# Patient Record
Sex: FEMALE | Race: OTHER | Employment: UNEMPLOYED | URBAN - METROPOLITAN AREA
[De-identification: names, ages, dates, MRNs, and addresses within clinical notes are randomized per-mention and may not be internally consistent; named-entity substitution may affect disease eponyms.]

---

## 2019-12-16 LAB — GRBS, EXTERNAL: POSITIVE

## 2019-12-20 PROBLEM — Z3A.39 PREGNANCY WITH 39 COMPLETED WEEKS GESTATION: Status: ACTIVE | Noted: 2019-12-20

## 2019-12-21 PROBLEM — O99.820 GBS (GROUP B STREPTOCOCCUS CARRIER), +RV CULTURE, CURRENTLY PREGNANT: Status: ACTIVE | Noted: 2019-12-21

## 2019-12-21 PROBLEM — O99.810 ABNORMAL GLUCOSE TOLERANCE TEST (GTT) DURING PREGNANCY, ANTEPARTUM: Status: ACTIVE | Noted: 2019-12-21

## 2019-12-22 ENCOUNTER — HOSPITAL ENCOUNTER (INPATIENT)
Age: 24
LOS: 2 days | Discharge: HOME | DRG: 560 | End: 2019-12-24
Attending: OBSTETRICS & GYNECOLOGY | Admitting: OBSTETRICS & GYNECOLOGY
Payer: MEDICAID

## 2019-12-22 PROBLEM — O47.9 UTERINE CONTRACTIONS DURING PREGNANCY: Status: ACTIVE | Noted: 2019-12-22

## 2019-12-22 PROBLEM — O09.293 HX OF MATERNAL LACERATION, 4TH DEGREE, CURRENTLY PREGNANT, THIRD TRIMESTER: Status: ACTIVE | Noted: 2019-12-22

## 2019-12-22 LAB
ABO + RH BLD: NORMAL
BASOPHILS # BLD: 0 K/UL (ref 0–0.1)
BASOPHILS NFR BLD: 0 % (ref 0–2)
BLOOD GROUP ANTIBODIES SERPL: NORMAL
DIFFERENTIAL METHOD BLD: ABNORMAL
EOSINOPHIL # BLD: 0.2 K/UL (ref 0–0.4)
EOSINOPHIL NFR BLD: 1 % (ref 0–5)
ERYTHROCYTE [DISTWIDTH] IN BLOOD BY AUTOMATED COUNT: 13.9 % (ref 11.6–14.5)
HCT VFR BLD AUTO: 32.7 % (ref 35–45)
HGB BLD-MCNC: 10.3 G/DL (ref 12–16)
LYMPHOCYTES # BLD: 1.9 K/UL (ref 0.9–3.6)
LYMPHOCYTES NFR BLD: 18 % (ref 21–52)
MCH RBC QN AUTO: 23.9 PG (ref 24–34)
MCHC RBC AUTO-ENTMCNC: 31.5 G/DL (ref 31–37)
MCV RBC AUTO: 75.9 FL (ref 74–97)
MONOCYTES # BLD: 0.6 K/UL (ref 0.05–1.2)
MONOCYTES NFR BLD: 6 % (ref 3–10)
NEUTS SEG # BLD: 7.9 K/UL (ref 1.8–8)
NEUTS SEG NFR BLD: 75 % (ref 40–73)
PLATELET # BLD AUTO: 211 K/UL (ref 135–420)
PMV BLD AUTO: 9.9 FL (ref 9.2–11.8)
RBC # BLD AUTO: 4.31 M/UL (ref 4.2–5.3)
SPECIMEN EXP DATE BLD: NORMAL
WBC # BLD AUTO: 10.6 K/UL (ref 4.6–13.2)

## 2019-12-22 PROCEDURE — 86900 BLOOD TYPING SEROLOGIC ABO: CPT

## 2019-12-22 PROCEDURE — 75410000003 HC RECOV DEL/VAG/CSECN EA 0.5 HR

## 2019-12-22 PROCEDURE — 74011250637 HC RX REV CODE- 250/637: Performed by: OBSTETRICS & GYNECOLOGY

## 2019-12-22 PROCEDURE — 0HQ9XZZ REPAIR PERINEUM SKIN, EXTERNAL APPROACH: ICD-10-PCS | Performed by: OBSTETRICS & GYNECOLOGY

## 2019-12-22 PROCEDURE — 74011000250 HC RX REV CODE- 250

## 2019-12-22 PROCEDURE — 65270000029 HC RM PRIVATE

## 2019-12-22 PROCEDURE — 74011000258 HC RX REV CODE- 258: Performed by: OBSTETRICS & GYNECOLOGY

## 2019-12-22 PROCEDURE — 77010026065 HC OXYGEN MINIMUM MEDICAL AIR

## 2019-12-22 PROCEDURE — 85025 COMPLETE CBC W/AUTO DIFF WBC: CPT

## 2019-12-22 PROCEDURE — 75410000000 HC DELIVERY VAGINAL/SINGLE

## 2019-12-22 PROCEDURE — 74011250636 HC RX REV CODE- 250/636: Performed by: OBSTETRICS & GYNECOLOGY

## 2019-12-22 PROCEDURE — 75410000002 HC LABOR FEE PER 1 HR

## 2019-12-22 RX ORDER — NALBUPHINE HYDROCHLORIDE 10 MG/ML
10 INJECTION, SOLUTION INTRAMUSCULAR; INTRAVENOUS; SUBCUTANEOUS
Status: DISCONTINUED | OUTPATIENT
Start: 2019-12-22 | End: 2019-12-22 | Stop reason: HOSPADM

## 2019-12-22 RX ORDER — OXYCODONE AND ACETAMINOPHEN 5; 325 MG/1; MG/1
2 TABLET ORAL
Status: DISCONTINUED | OUTPATIENT
Start: 2019-12-22 | End: 2019-12-24 | Stop reason: HOSPADM

## 2019-12-22 RX ORDER — PHENYLEPHRINE HCL IN 0.9% NACL 1 MG/10 ML
100 SYRINGE (ML) INTRAVENOUS AS NEEDED
Status: DISCONTINUED | OUTPATIENT
Start: 2019-12-22 | End: 2019-12-22 | Stop reason: HOSPADM

## 2019-12-22 RX ORDER — MINERAL OIL
30 OIL (ML) ORAL AS NEEDED
Status: DISCONTINUED | OUTPATIENT
Start: 2019-12-22 | End: 2019-12-22 | Stop reason: HOSPADM

## 2019-12-22 RX ORDER — FENTANYL/ROPIVACAINE/NS/PF 2MCG/ML-.1
PLASTIC BAG, INJECTION (ML) EPIDURAL
Status: DISCONTINUED
Start: 2019-12-22 | End: 2019-12-22 | Stop reason: WASHOUT

## 2019-12-22 RX ORDER — BUTORPHANOL TARTRATE 2 MG/ML
2 INJECTION INTRAMUSCULAR; INTRAVENOUS
Status: DISCONTINUED | OUTPATIENT
Start: 2019-12-22 | End: 2019-12-22 | Stop reason: HOSPADM

## 2019-12-22 RX ORDER — OXYTOCIN/0.9 % SODIUM CHLORIDE 20/1000 ML
PLASTIC BAG, INJECTION (ML) INTRAVENOUS
Status: DISPENSED
Start: 2019-12-22 | End: 2019-12-23

## 2019-12-22 RX ORDER — TERBUTALINE SULFATE 1 MG/ML
0.25 INJECTION SUBCUTANEOUS
Status: DISCONTINUED | OUTPATIENT
Start: 2019-12-22 | End: 2019-12-22 | Stop reason: HOSPADM

## 2019-12-22 RX ORDER — FENTANYL/ROPIVACAINE/NS/PF 2MCG/ML-.1
1-15 PLASTIC BAG, INJECTION (ML) EPIDURAL
Status: DISCONTINUED | OUTPATIENT
Start: 2019-12-22 | End: 2019-12-22 | Stop reason: HOSPADM

## 2019-12-22 RX ORDER — SODIUM CHLORIDE, SODIUM LACTATE, POTASSIUM CHLORIDE, CALCIUM CHLORIDE 600; 310; 30; 20 MG/100ML; MG/100ML; MG/100ML; MG/100ML
125 INJECTION, SOLUTION INTRAVENOUS CONTINUOUS
Status: DISCONTINUED | OUTPATIENT
Start: 2019-12-22 | End: 2019-12-22 | Stop reason: HOSPADM

## 2019-12-22 RX ORDER — HYDROMORPHONE HYDROCHLORIDE 1 MG/ML
1 INJECTION, SOLUTION INTRAMUSCULAR; INTRAVENOUS; SUBCUTANEOUS
Status: DISCONTINUED | OUTPATIENT
Start: 2019-12-22 | End: 2019-12-22 | Stop reason: HOSPADM

## 2019-12-22 RX ORDER — LIDOCAINE HYDROCHLORIDE 10 MG/ML
INJECTION, SOLUTION EPIDURAL; INFILTRATION; INTRACAUDAL; PERINEURAL
Status: COMPLETED
Start: 2019-12-22 | End: 2019-12-22

## 2019-12-22 RX ORDER — IBUPROFEN 400 MG/1
800 TABLET ORAL
Status: DISCONTINUED | OUTPATIENT
Start: 2019-12-22 | End: 2019-12-24 | Stop reason: HOSPADM

## 2019-12-22 RX ORDER — ACETAMINOPHEN 325 MG/1
650 TABLET ORAL
Status: DISCONTINUED | OUTPATIENT
Start: 2019-12-22 | End: 2019-12-24 | Stop reason: HOSPADM

## 2019-12-22 RX ORDER — FENTANYL CITRATE 50 UG/ML
INJECTION, SOLUTION INTRAMUSCULAR; INTRAVENOUS
Status: DISPENSED
Start: 2019-12-22 | End: 2019-12-23

## 2019-12-22 RX ORDER — METHYLERGONOVINE MALEATE 0.2 MG/ML
0.2 INJECTION INTRAVENOUS AS NEEDED
Status: COMPLETED | OUTPATIENT
Start: 2019-12-22 | End: 2019-12-22

## 2019-12-22 RX ORDER — PROMETHAZINE HYDROCHLORIDE 25 MG/ML
25 INJECTION, SOLUTION INTRAMUSCULAR; INTRAVENOUS
Status: DISCONTINUED | OUTPATIENT
Start: 2019-12-22 | End: 2019-12-24 | Stop reason: HOSPADM

## 2019-12-22 RX ORDER — AMOXICILLIN 250 MG
1 CAPSULE ORAL
Status: DISCONTINUED | OUTPATIENT
Start: 2019-12-22 | End: 2019-12-24 | Stop reason: HOSPADM

## 2019-12-22 RX ORDER — ZOLPIDEM TARTRATE 5 MG/1
5 TABLET ORAL
Status: DISCONTINUED | OUTPATIENT
Start: 2019-12-22 | End: 2019-12-24 | Stop reason: HOSPADM

## 2019-12-22 RX ORDER — NALBUPHINE HYDROCHLORIDE 10 MG/ML
5 INJECTION, SOLUTION INTRAMUSCULAR; INTRAVENOUS; SUBCUTANEOUS
Status: DISCONTINUED | OUTPATIENT
Start: 2019-12-22 | End: 2019-12-22 | Stop reason: HOSPADM

## 2019-12-22 RX ORDER — METHYLERGONOVINE MALEATE 0.2 MG/ML
0.2 INJECTION INTRAVENOUS ONCE
Status: COMPLETED | OUTPATIENT
Start: 2019-12-23 | End: 2019-12-23

## 2019-12-22 RX ORDER — FENTANYL CITRATE 50 UG/ML
100 INJECTION, SOLUTION INTRAMUSCULAR; INTRAVENOUS ONCE
Status: DISCONTINUED | OUTPATIENT
Start: 2019-12-22 | End: 2019-12-22 | Stop reason: HOSPADM

## 2019-12-22 RX ORDER — OXYTOCIN/0.9 % SODIUM CHLORIDE 20/1000 ML
999 PLASTIC BAG, INJECTION (ML) INTRAVENOUS ONCE
Status: COMPLETED | OUTPATIENT
Start: 2019-12-22 | End: 2019-12-22

## 2019-12-22 RX ORDER — MINERAL OIL
OIL (ML) ORAL
Status: DISCONTINUED
Start: 2019-12-22 | End: 2019-12-22 | Stop reason: WASHOUT

## 2019-12-22 RX ORDER — DIPHENHYDRAMINE HYDROCHLORIDE 50 MG/ML
25 INJECTION, SOLUTION INTRAMUSCULAR; INTRAVENOUS
Status: DISCONTINUED | OUTPATIENT
Start: 2019-12-22 | End: 2019-12-22 | Stop reason: HOSPADM

## 2019-12-22 RX ORDER — OXYTOCIN/0.9 % SODIUM CHLORIDE 20/1000 ML
125 PLASTIC BAG, INJECTION (ML) INTRAVENOUS CONTINUOUS
Status: DISCONTINUED | OUTPATIENT
Start: 2019-12-22 | End: 2019-12-22 | Stop reason: HOSPADM

## 2019-12-22 RX ORDER — LIDOCAINE HYDROCHLORIDE 10 MG/ML
20 INJECTION, SOLUTION EPIDURAL; INFILTRATION; INTRACAUDAL; PERINEURAL AS NEEDED
Status: DISCONTINUED | OUTPATIENT
Start: 2019-12-22 | End: 2019-12-22 | Stop reason: HOSPADM

## 2019-12-22 RX ORDER — NALOXONE HYDROCHLORIDE 0.4 MG/ML
0.2 INJECTION, SOLUTION INTRAMUSCULAR; INTRAVENOUS; SUBCUTANEOUS AS NEEDED
Status: DISCONTINUED | OUTPATIENT
Start: 2019-12-22 | End: 2019-12-22 | Stop reason: HOSPADM

## 2019-12-22 RX ADMIN — METHYLERGONOVINE MALEATE 0.2 MG: 0.2 INJECTION, SOLUTION INTRAMUSCULAR; INTRAVENOUS at 20:48

## 2019-12-22 RX ADMIN — SODIUM CHLORIDE 5 MILLION UNITS: 900 INJECTION INTRAVENOUS at 19:20

## 2019-12-22 RX ADMIN — OXYCODONE HYDROCHLORIDE AND ACETAMINOPHEN 2 TABLET: 5; 325 TABLET ORAL at 21:22

## 2019-12-22 RX ADMIN — Medication 19980 MILLI-UNITS/HR: at 19:58

## 2019-12-22 RX ADMIN — SODIUM CHLORIDE, SODIUM LACTATE, POTASSIUM CHLORIDE, AND CALCIUM CHLORIDE 125 ML/HR: 600; 310; 30; 20 INJECTION, SOLUTION INTRAVENOUS at 18:35

## 2019-12-22 RX ADMIN — LIDOCAINE HYDROCHLORIDE 30 ML: 10 INJECTION, SOLUTION EPIDURAL; INFILTRATION; INTRACAUDAL; PERINEURAL at 20:13

## 2019-12-23 LAB
HCT VFR BLD AUTO: 30.7 % (ref 35–45)
HGB BLD-MCNC: 9.6 G/DL (ref 12–16)

## 2019-12-23 PROCEDURE — 36415 COLL VENOUS BLD VENIPUNCTURE: CPT

## 2019-12-23 PROCEDURE — 85018 HEMOGLOBIN: CPT

## 2019-12-23 PROCEDURE — 74011250637 HC RX REV CODE- 250/637: Performed by: OBSTETRICS & GYNECOLOGY

## 2019-12-23 PROCEDURE — 85014 HEMATOCRIT: CPT

## 2019-12-23 PROCEDURE — 65270000029 HC RM PRIVATE

## 2019-12-23 PROCEDURE — 74011250636 HC RX REV CODE- 250/636: Performed by: OBSTETRICS & GYNECOLOGY

## 2019-12-23 RX ORDER — LANOLIN ALCOHOL/MO/W.PET/CERES
1 CREAM (GRAM) TOPICAL 2 TIMES DAILY WITH MEALS
Status: DISCONTINUED | OUTPATIENT
Start: 2019-12-23 | End: 2019-12-24 | Stop reason: HOSPADM

## 2019-12-23 RX ADMIN — IBUPROFEN 800 MG: 400 TABLET, FILM COATED ORAL at 08:18

## 2019-12-23 RX ADMIN — SENNOSIDES AND DOCUSATE SODIUM 1 TABLET: 8.6; 5 TABLET ORAL at 15:46

## 2019-12-23 RX ADMIN — IBUPROFEN 800 MG: 400 TABLET, FILM COATED ORAL at 15:46

## 2019-12-23 RX ADMIN — FERROUS SULFATE TAB 325 MG (65 MG ELEMENTAL FE) 325 MG: 325 (65 FE) TAB at 17:03

## 2019-12-23 RX ADMIN — FERROUS SULFATE TAB 325 MG (65 MG ELEMENTAL FE) 325 MG: 325 (65 FE) TAB at 08:18

## 2019-12-23 RX ADMIN — METHYLERGONOVINE MALEATE 0.2 MG: 0.2 INJECTION, SOLUTION INTRAMUSCULAR; INTRAVENOUS at 02:38

## 2019-12-23 NOTE — PROGRESS NOTES
Patient presents to unit with complaints of contractions at 39w4d, a , with hx of baby 1 year ago. Patient speaks Georgian only and her mother and  are with her for translation. Patient taken to [facility] for assessment.  Bedside and verbal shift report given to STEVEN Prater RN. Care of patient relinquished at this time.      Dr Katty Fortune wasted Fentanyl vial.

## 2019-12-23 NOTE — PROGRESS NOTES
Problem: Patient Education: Go to Patient Education Activity  Goal: Patient/Family Education  Outcome: Progressing Towards Goal     Problem: Vaginal Delivery: Day of Deliver-Laboring  Goal: Activity/Safety  Outcome: Progressing Towards Goal  Goal: Consults, if ordered  Outcome: Progressing Towards Goal  Goal: Diagnostic Test/Procedures  Outcome: Progressing Towards Goal  Goal: Nutrition/Diet  Outcome: Progressing Towards Goal  Goal: Discharge Planning  Outcome: Progressing Towards Goal  Goal: Medications  Outcome: Progressing Towards Goal  Goal: Respiratory  Outcome: Progressing Towards Goal  Goal: Treatments/Interventions/Procedures  Outcome: Progressing Towards Goal  Goal: *Vital signs within defined limits  Outcome: Progressing Towards Goal  Goal: *Labs within defined limits  Outcome: Progressing Towards Goal  Goal: *Hemodynamically stable  Outcome: Progressing Towards Goal  Goal: *Optimal pain control at patient's stated goal  Outcome: Progressing Towards Goal     Problem: Pain  Goal: *Control of Pain  Outcome: Progressing Towards Goal

## 2019-12-23 NOTE — PROGRESS NOTES
Post-Partum Day Number 1 Progress Note    Maura Gordon     Assessment:   Hospital Problems  Date Reviewed: 2019          Codes Class Noted POA    Vaginal delivery ICD-10-CM: O80  ICD-9-CM: 542  2019 No    Overview Signed 2019  7:49 AM by Kvng Zhao MD     x1             Perineal laceration, first degree, delivered ICD-10-CM: O70.0  ICD-9-CM: 664.01  2019 No        Uterine contractions during pregnancy ICD-10-CM: O62.2  ICD-9-CM: 661.20  2019 Yes        Hx of maternal laceration, 4th degree, currently pregnant, third trimester ICD-10-CM: O09.293  ICD-9-CM: V23.49  2019 Yes        GBS (group B Streptococcus carrier), +RV culture, currently pregnant ICD-10-CM: O99.820  ICD-9-CM: V23.89, V02.51  2019 Yes        Pregnancy with 39 completed weeks gestation ICD-10-CM: Z3A.39  ICD-9-CM: V22.2  2019 Yes    Overview Signed 2019  3:57 PM by Kvng Zhao MD     EFW 63%, 8 lbs. Vertex. Doing well, post partum day 1    Plan:  1. Continue routine postpartum and perineal care as well as maternal education. 2. The risks and benefits of the circumcision  procedure and anesthesia including: bleeding, infection, variability of cosmetic results were discussed at length with the mother. She is aware that future repeat procedures may be necessary. She gives informed consent to proceed as noted and her questions are answered. Information for the patient's :  Braulio Schneider [[id number]]   Vaginal, Spontaneous   Patient doing well without significant complaint. Voiding without difficulty, normal lochia. Patient is having some pain where her stitches are. She is nursing. Translation provided by her .     Current Facility-Administered Medications   Medication Dose Route Frequency    influenza vaccine  (6 mos+)(PF) (FLUARIX/FLULAVAL/FLUZONE QUAD) injection 0.5 mL  0.5 mL IntraMUSCular PRIOR TO DISCHARGE Vitals:  Visit Vitals  /66 (BP 1 Location: Right arm, BP Patient Position: At rest)   Pulse 97   Temp 97.9 °F (36.6 °C)   Resp 18   Ht 5' 1\" (1.549 m)   Wt 151 lb (68.5 kg)   LMP 2019 (Approximate)   Breastfeeding Unknown   BMI 28.53 kg/m²     Temp (24hrs), Av.1 °F (36.7 °C), Min:97.9 °F (36.6 °C), Max:98.4 °F (36.9 °C)        Exam:   Patient without distress. Abdomen soft, fundus firm, nontender                Perineum with normal lochia noted. Lower extremities are negative for swelling, cords or tenderness. Labs:     Lab Results   Component Value Date/Time    WBC 10.6 2019 06:35 PM    HGB 9.6 (L) 2019 02:55 AM    HGB 10.3 (L) 2019 06:35 PM    HCT 30.7 (L) 2019 02:55 AM    HCT 32.7 (L) 2019 06:35 PM    PLATELET 920 9306 06:35 PM    Hgb, External 11.1 2019    Hct, External 34 2019    Platelet cnt., External 185 2019       Recent Results (from the past 24 hour(s))   CBC WITH AUTOMATED DIFF    Collection Time: 19  6:35 PM   Result Value Ref Range    WBC 10.6 4.6 - 13.2 K/uL    RBC 4.31 4.20 - 5.30 M/uL    HGB 10.3 (L) 12.0 - 16.0 g/dL    HCT 32.7 (L) 35.0 - 45.0 %    MCV 75.9 74.0 - 97.0 FL    MCH 23.9 (L) 24.0 - 34.0 PG    MCHC 31.5 31.0 - 37.0 g/dL    RDW 13.9 11.6 - 14.5 %    PLATELET 665 695 - 364 K/uL    MPV 9.9 9.2 - 11.8 FL    NEUTROPHILS 75 (H) 40 - 73 %    LYMPHOCYTES 18 (L) 21 - 52 %    MONOCYTES 6 3 - 10 %    EOSINOPHILS 1 0 - 5 %    BASOPHILS 0 0 - 2 %    ABS. NEUTROPHILS 7.9 1.8 - 8.0 K/UL    ABS. LYMPHOCYTES 1.9 0.9 - 3.6 K/UL    ABS. MONOCYTES 0.6 0.05 - 1.2 K/UL    ABS. EOSINOPHILS 0.2 0.0 - 0.4 K/UL    ABS.  BASOPHILS 0.0 0.0 - 0.1 K/UL    DF AUTOMATED     TYPE & SCREEN    Collection Time: 19  6:35 PM   Result Value Ref Range    Crossmatch Expiration 2019     ABO/Rh(D) B POSITIVE     Antibody screen NEG    HEMATOCRIT    Collection Time: 19  2:55 AM   Result Value Ref Range    HCT 30.7 (L) 35.0 - 45.0 %   HEMOGLOBIN    Collection Time: 12/23/19  2:55 AM   Result Value Ref Range    HGB 9.6 (L) 12.0 - 16.0 g/dL

## 2019-12-23 NOTE — PROGRESS NOTES
Bedside and Verbal shift change report given to SHIRA Chase LPN (oncoming nurse) by SHIRA Candelaria RN (offgoing nurse). Report included the following information SBAR, Kardex, Procedure Summary, Intake/Output, MAR, Accordion, Recent Results and Med Rec Status.

## 2019-12-23 NOTE — H&P
Ostetrical History and Physical    Subjective:     Date of Admission: 2019    Patient is a 27 y.o.   female admitted with labor. Note 4th degree previous del. For Obstetric history, see prenantal.    For Review of Systems, see prenatal    Past Medical History:   Diagnosis Date    Vaginal delivery     x1      No past surgical history on file. Prior to Admission medications    Not on File     No Known Allergies   Social History     Tobacco Use    Smoking status: Never Smoker    Smokeless tobacco: Never Used   Substance Use Topics    Alcohol use: Not Currently      No family history on file. Objective:     Blood pressure 121/80, pulse 98, temperature 98.4 °F (36.9 °C), resp. rate 18, height 5' 1\" (1.549 m), weight 68.5 kg (151 lb), last menstrual period 2019. Temp (24hrs), Av.2 °F (36.8 °C), Min:98 °F (36.7 °C), Max:98.4 °F (36.9 °C)        No intake/output data recorded. No intake/output data recorded. HEENT: No pallor, no jaundice, Thyroid and throat normal  RESPIRATORY: Clear to A & P  CVS: pulse reg, HS normal  ABDOMEN: Gravid. Vertex. EFW=7lb +-1lb. No abnormal tenderness. Pelvic: Cervix 7, (by RN)  Effaced: 90%  Station:  -1  Data Review:   Recent Results (from the past 24 hour(s))   CBC WITH AUTOMATED DIFF    Collection Time: 19  6:35 PM   Result Value Ref Range    WBC 10.6 4.6 - 13.2 K/uL    RBC 4.31 4.20 - 5.30 M/uL    HGB 10.3 (L) 12.0 - 16.0 g/dL    HCT 32.7 (L) 35.0 - 45.0 %    MCV 75.9 74.0 - 97.0 FL    MCH 23.9 (L) 24.0 - 34.0 PG    MCHC 31.5 31.0 - 37.0 g/dL    RDW 13.9 11.6 - 14.5 %    PLATELET 039 103 - 489 K/uL    MPV 9.9 9.2 - 11.8 FL    NEUTROPHILS 75 (H) 40 - 73 %    LYMPHOCYTES 18 (L) 21 - 52 %    MONOCYTES 6 3 - 10 %    EOSINOPHILS 1 0 - 5 %    BASOPHILS 0 0 - 2 %    ABS. NEUTROPHILS 7.9 1.8 - 8.0 K/UL    ABS. LYMPHOCYTES 1.9 0.9 - 3.6 K/UL    ABS. MONOCYTES 0.6 0.05 - 1.2 K/UL    ABS. EOSINOPHILS 0.2 0.0 - 0.4 K/UL    ABS.  BASOPHILS 0.0 0.0 - 0.1 K/UL    DF AUTOMATED     TYPE & SCREEN    Collection Time: 12/22/19  6:35 PM   Result Value Ref Range    Crossmatch Expiration 12/25/2019     ABO/Rh(D) B POSITIVE     Antibody screen NEG      Monitor:  Reactivity:present Variability:present Baseline:within normal limits    Assessment:     Active Problems:    Pregnancy with 39 completed weeks gestation (12/20/2019)      Overview: EFW 63%, 8 lbs. Vertex. GBS (group B Streptococcus carrier), +RV culture, currently pregnant (12/21/2019)      Uterine contractions during pregnancy (12/22/2019)      Hx of maternal laceration, 4th degree, currently pregnant, third trimester (12/22/2019)        Plan:   Delivery soon. Patient initially did not want male Dr. At the time of imminent del, changed her mind as no one else available. Check labs:  CBC  Check  Prenatal:    Disposition:     Type of admit:In-Patient    I saw and examined patient.     Signed By: Alvaro Quintero MD                         December 22, 2019

## 2019-12-23 NOTE — PROGRESS NOTES
Bedside shift change report given to Dann Beltrán RN (oncoming nurse) by Susie Pérez RN   (offgoing nurse). Report given with SBAR, Reyna, MAR and Recent Results.

## 2019-12-23 NOTE — PROGRESS NOTES
Assumed care of pt.  0818-pain med given. Pt eating breakfast.  Denies any other needs. 0915-pain med effective. 0945-Assessment completed. Pt c/o string protruding from vagina. Pt had to ambulated to bathroom and void for me to see it. Paged Dr. Lachelle Benson to notify her of it. 0955-Dr. Lachelle Benson returned call- will be in today to check sutures and trim. 1020-assisted with breast feeding. 1230-resting in bed.  1320-callling for peds appt. 1415-assisted mother with bottle feeding infant. 1546-pain med and senna given. Denies any other needs. 1645-resting in bed. Denies needs. 1703- ferrous sulfate given. 1840-breast feeding. 1925-Bedside and Verbal shift change report given to GOMEZ langley RN  (oncoming nurse) by SHIRA Chase LPN (offgoing nurse). Report given with SBAR, Kardex, Intake/Output, MAR and Recent Results.

## 2019-12-23 NOTE — L&D DELIVERY NOTE
Vaginal Delivery Procedure Note    Name: Cesar Aguilar   Medical Record Number: [id number]   YOB: 1995  Today's Date: 2019        Procedure: VAGINAL DELIVERY without suction or forceps       Anesthesia:  Local for epis only    Extra Procedure Details:  no     Estimated Blood Loss: 250    Fetal Description: kingston male     Anterior shoulder: right    Umbilical Cord: Nuchal Cord x  1 and 3 vessels present,  tight    Episiotomy: yes   Tear: noType:small R med lat epis cut and was able to slip head by with NO damage to rectal m. Degree: 1st degree   Repair:   2/0 cc             Cord Blood Results:   Information for the patient's :  Azalea Gray [[id number]]   @ABG@      Birth Information:   Information for the patient's :  Azalea Gray [[id number]]      apgars 8,9     Specimens: Placenta was not sent     Placenta:    Spontaneous with assist and apparently intact on exam          Complications:  none     Birth Weight: see baby part of chart    Mother's Condition: good  Baby's Condition: good  Sponge and needle count:    correct    I was present for the delivery.  Delivered for  [facility]  Signed: Flaca Denney MD      2019

## 2019-12-23 NOTE — LACTATION NOTE
Attempted feeding, but infant very sleepy and unable to latch and nurse. Encouraged skin to skin and feed in an hour. Mom educated on breastfeeding basics--hunger cues, feeding on demand, waking baby if baby sleeps too long between feeds, importance of skin to skin, positioning and latching, risk of pacifier use and supplemental feedings, and importance of rooming in--and use of log sheet. Mom also educated on benefits of breastfeeding for herself and baby. Mom verbalized understanding. No questions at this time.

## 2019-12-23 NOTE — PROGRESS NOTES
- Bedside and Verbal shift change report given to STEVEN Farfan RN (oncoming nurse) by Letty Feldman. Natalie Burks RN (offgoing nurse). Report included the following information SBAR, Kardex, Intake/Output, MAR and Recent Results.  - Assessment complete. POC discussed  200 -Dr. Kimmie Sandhu at bedside for epidural placement. Procedure explained to include risks and benefits. Verbalizes understanding. All questions answered.  - Sitting up on side of bed. Position reviewed.  - Time out   - Epidural not placed due to urge to push. SVE complete/100/+3    - SROM clear fluid   - Dr. Poppy Sylvester at bedside, [address] -  viable baby boy   - Pitocin bolus   - placenta delivered   - liz care performed   - Dr. Poppy Sylvester present on unit, informed about patient lochia. Orders for IM Methergine and repeat at 0200.   - Methergine given IM  2220 - TRANSFER - IN REPORT:    Verbal report received from TOSIN Kraft RN(name) on Lashay Genao  being received from PP (unit) for routine progression of care      Report consisted of patients Situation, Background, Assessment and   Recommendations(SBAR). Information from the following report(s) SBAR, Kardex, Intake/Output, MAR and Recent Results was reviewed with the receiving nurse. Opportunity for questions and clarification was provided. Assessment completed upon patients arrival to unit and care assumed.

## 2019-12-23 NOTE — PERIOP NOTES
While sitting for epidural, pt had urge to push and epidural could not be placed. Will be available if needed.

## 2019-12-24 VITALS
BODY MASS INDEX: 28.51 KG/M2 | SYSTOLIC BLOOD PRESSURE: 109 MMHG | HEART RATE: 83 BPM | OXYGEN SATURATION: 100 % | DIASTOLIC BLOOD PRESSURE: 74 MMHG | RESPIRATION RATE: 16 BRPM | TEMPERATURE: 97.5 F | HEIGHT: 61 IN | WEIGHT: 151 LBS

## 2019-12-24 PROBLEM — Z3A.39 PREGNANCY WITH 39 COMPLETED WEEKS GESTATION: Status: RESOLVED | Noted: 2019-12-20 | Resolved: 2019-12-24

## 2019-12-24 PROBLEM — O99.810 ABNORMAL GLUCOSE TOLERANCE TEST (GTT) DURING PREGNANCY, ANTEPARTUM: Status: RESOLVED | Noted: 2019-12-21 | Resolved: 2019-12-24

## 2019-12-24 PROBLEM — O09.293 HX OF MATERNAL LACERATION, 4TH DEGREE, CURRENTLY PREGNANT, THIRD TRIMESTER: Status: RESOLVED | Noted: 2019-12-22 | Resolved: 2019-12-24

## 2019-12-24 PROBLEM — O99.820 GBS (GROUP B STREPTOCOCCUS CARRIER), +RV CULTURE, CURRENTLY PREGNANT: Status: RESOLVED | Noted: 2019-12-21 | Resolved: 2019-12-24

## 2019-12-24 PROBLEM — O47.9 UTERINE CONTRACTIONS DURING PREGNANCY: Status: RESOLVED | Noted: 2019-12-22 | Resolved: 2019-12-24

## 2019-12-24 PROCEDURE — 74011250637 HC RX REV CODE- 250/637: Performed by: OBSTETRICS & GYNECOLOGY

## 2019-12-24 PROCEDURE — 90686 IIV4 VACC NO PRSV 0.5 ML IM: CPT | Performed by: OBSTETRICS & GYNECOLOGY

## 2019-12-24 PROCEDURE — 90471 IMMUNIZATION ADMIN: CPT

## 2019-12-24 PROCEDURE — 90715 TDAP VACCINE 7 YRS/> IM: CPT | Performed by: OBSTETRICS & GYNECOLOGY

## 2019-12-24 PROCEDURE — 74011250636 HC RX REV CODE- 250/636: Performed by: OBSTETRICS & GYNECOLOGY

## 2019-12-24 RX ORDER — IBUPROFEN 800 MG/1
800 TABLET ORAL
Qty: 30 TAB | Refills: 1 | Status: SHIPPED | OUTPATIENT
Start: 2019-12-24

## 2019-12-24 RX ADMIN — SENNOSIDES AND DOCUSATE SODIUM 1 TABLET: 8.6; 5 TABLET ORAL at 07:38

## 2019-12-24 RX ADMIN — FERROUS SULFATE TAB 325 MG (65 MG ELEMENTAL FE) 325 MG: 325 (65 FE) TAB at 07:38

## 2019-12-24 RX ADMIN — IBUPROFEN 800 MG: 400 TABLET, FILM COATED ORAL at 07:38

## 2019-12-24 RX ADMIN — TETANUS TOXOID, REDUCED DIPHTHERIA TOXOID AND ACELLULAR PERTUSSIS VACCINE, ADSORBED 0.5 ML: 5; 2.5; 8; 8; 2.5 SUSPENSION INTRAMUSCULAR at 11:59

## 2019-12-24 RX ADMIN — INFLUENZA VIRUS VACCINE 0.5 ML: 15; 15; 15; 15 SUSPENSION INTRAMUSCULAR at 12:02

## 2019-12-24 NOTE — DISCHARGE INSTRUCTIONS
POST DELIVERY DISCHARGE INSTRUCTIONS    Name: Stephenie Simms  YOB: 1995  Primary Diagnosis: Active Problems:    Vaginal delivery (12/23/2019)      Overview: x1      Perineal laceration, first degree, delivered (12/23/2019)        General:     Diet/Diet Restrictions:  Eight 8-ounce glasses of fluid daily (water, juices); avoid excessive caffeine intake. Meals/snacks as desired which are high in fiber and carbohydrates and low in fat and cholesterol. Physical Activity / Restrictions / Safety:     Avoid heavy lifting, no more that 8 lbs. For 2-3 weeks; limit use of stairs to 2 times daily for the first week home. No driving for one week. Avoid intercourse 4-6 weeks, no douching or tampon use. Check with obstetrician before starting or resuming an exercise program.         Discharge Instructions/Special Treatment/Home Care Needs:     Continue prenatal vitamins. Continue to use squirt bottle with warm water on your episiotomy after each bathroom use until bleeding stops. Call your doctor for the following:     Fever over 100.4 degrees by mouth. Vaginal bleeding heavier than a normal menstrual period or clot larger than a golf ball. Red streaks or increased swelling of legs, painful red streaks on your breast.  Painful urination, constipation and increased pain or swelling. If you feel extremely anxious or overwhelmed. If you have thoughts of harming yourself and/or your baby. Pain Management:     Pain Management:   Take Acetaminophen (Tylenol) or Ibuprofen (Advil, Motrin), as directed for pain. Use a warm Sitz bath 3 times daily to relieve episiotomy or hemorrhoidal discomfort. For hemorrhoidal discomfort, use Tucks and Anusol cream as needed and directed. Follow-Up Care:      These are general instructions for a healthy lifestyle:    No smoking/ No tobacco products/ Avoid exposure to second hand smoke    Surgeon General's Warning:  Quitting smoking now greatly reduces serious risk to your health. Obesity, smoking, and sedentary lifestyle greatly increases your risk for illness    A healthy diet, regular physical exercise & weight monitoring are important for maintaining a healthy lifestyle    Recognize signs and symptoms of STROKE:    F-face looks uneven    A-arms unable to move or move unevenly    S-speech slurred or non-existent    T-time-call 911 as soon as signs and symptoms begin-DO NOT go       Back to bed or wait to see if you get better-TIME IS BRAIN. Call your OB to set a follow-up appointment for 6 weeks.

## 2019-12-24 NOTE — DISCHARGE SUMMARY
Obstetrical Discharge Summary     Name: Jeff Cardoso MRN: [id number]  SSN: [id number]    YOB: 1995  Age: 27 y.o. Sex: female      Admit Date: 2019    Discharge Date: 2019    Admitting Physician: Daily Woodruff MD     Attending Physician:  Maria Guadalupe Braxton MD     Discharge Diagnoses:   Information for the patient's :  Jayashree Alvarez [[id number]]   Delivery of a 7 lb 11.8 oz (3.51 kg) male infant via Vaginal, Spontaneous on 2019 at 7:55 PM  by Tami Olsen. Apgars were 8  and 9 . Additional Diagnoses:   Problem List as of 2019 Date Reviewed: 2019          Codes Class Noted - Resolved    Vaginal delivery ICD-10-CM: O80  ICD-9-CM: 396  2019 - Present    Overview Signed 2019  7:49 AM by Maria Guadalupe Braxton MD     x1             Perineal laceration, first degree, delivered ICD-10-CM: O70.0  ICD-9-CM: 664.01  2019 - Present        RESOLVED: Uterine contractions during pregnancy ICD-10-CM: O62.2  ICD-9-CM: 661.20  2019 - 2019        RESOLVED: Hx of maternal laceration, 4th degree, currently pregnant, third trimester ICD-10-CM: O09.293  ICD-9-CM: V23.49  2019 - 2019        RESOLVED: GBS (group B Streptococcus carrier), +RV culture, currently pregnant ICD-10-CM: O99.820  ICD-9-CM: V23.89, V02.51  2019 - 2019        RESOLVED: Abnormal glucose tolerance test (GTT) during pregnancy, antepartum ICD-10-CM: O99.810  ICD-9-CM: 648.83  2019 - 2019    Overview Signed 2019  8:24 AM by Ady Delgado NP     A1C 5.2             RESOLVED: Pregnancy with 39 completed weeks gestation ICD-10-CM: Z3A.39  ICD-9-CM: V22.2  2019 - 2019    Overview Signed 2019  3:57 PM by Maria Guadalupe Braxton MD     EFW 63%, 8 lbs. Vertex.                      Lab Results   Component Value Date/Time    GrBStrep, External POSITIVE 2019     Recent Labs     19  0255   [facility] following the delivery. Patient Instructions:   Current Discharge Medication List      START taking these medications    Details   ibuprofen (MOTRIN) 800 mg tablet Take 1 Tab by mouth every eight (8) hours as needed for Pain. Indications: pain  Qty: 30 Tab, Refills: 1             Reference my discharge instructions.     Follow-up Appointments   Procedures    FOLLOW UP VISIT Appointment in: 6 Weeks     Standing Status:   Standing     Number of Occurrences:   1     Order Specific Question:   Appointment in     Answer:   6 Weeks        Signed By:  Madhu Alexander MD     December 24, 2019                       BST

## 2019-12-24 NOTE — PROGRESS NOTES
Problem: Vaginal Delivery: Postpartum 2  Goal: Activity/Safety  Outcome: Progressing Towards Goal  Goal: Nutrition/Diet  Outcome: Progressing Towards Goal  Goal: Treatments/Interventions/Procedures  Outcome: Progressing Towards Goal  Goal: Psychosocial  Outcome: Progressing Towards Goal     Problem: Pain  Goal: *Control of Pain  Outcome: Progressing Towards Goal

## 2019-12-24 NOTE — PROGRESS NOTES
0730  Bedside and Verbal shift change report given to ANGEL Odell RN (oncoming nurse) by Wojciech Cummins. Elsa Hernandez RN (offgoing nurse). Report included the following information SBAR, Kardex, Intake/Output, MAR and Recent Results. 4203  Completed assessment and gave scheduled and PRN meds. No further needs at this time. 0900  Rounded on patient, no further needs at this time. 1045  Rounded on patient, no further needs at this time. 1150  Gave patient TDAP and Flu vaccines. Provided education. No further needs at this time. 1220  Completed quick disclosure, AVS, and education. Patient verbalized understanding of instructions. 1305  Bands removed and patient discharged.

## 2019-12-24 NOTE — LACTATION NOTE
This note was copied from a baby's chart. Per FOB (translated for Mom) mom has no questions. Per FOB,  has been feeding well.

## 2019-12-24 NOTE — PROGRESS NOTES
Problem: Patient Education: Go to Patient Education Activity  Goal: Patient/Family Education  Outcome: Resolved/Met     Problem: Pain  Goal: *Control of Pain  Outcome: Resolved/Met     Problem: Falls - Risk of  Goal: *Absence of Falls  Description  Document Yasemin Dawsonasad Fall Risk and appropriate interventions in the flowsheet.   Outcome: Resolved/Met  Note: Fall Risk Interventions:                                Problem: Patient Education: Go to Patient Education Activity  Goal: Patient/Family Education  Outcome: Resolved/Met     Problem: Vaginal Delivery: Postpartum Day 1  Goal: Activity/Safety  Outcome: Resolved/Met  Goal: Consults, if ordered  Outcome: Resolved/Met  Goal: Diagnostic Test/Procedures  Outcome: Resolved/Met  Goal: Nutrition/Diet  Outcome: Resolved/Met  Goal: Discharge Planning  Outcome: Resolved/Met  Goal: Medications  Outcome: Resolved/Met  Goal: Treatments/Interventions/Procedures  Outcome: Resolved/Met  Goal: Psychosocial  Outcome: Resolved/Met  Goal: *Vital signs within defined limits  Outcome: Resolved/Met  Goal: *Labs within defined limits  Outcome: Resolved/Met  Goal: *Hemodynamically stable  Outcome: Resolved/Met  Goal: *Optimal pain control at patient's stated goal  Outcome: Resolved/Met  Goal: *Participates in infant care  Outcome: Resolved/Met  Goal: *Demonstrates progressive activity  Outcome: Resolved/Met  Goal: *Performs self perineal care  Outcome: Resolved/Met  Goal: *Appropriate parent-infant bonding  Outcome: Resolved/Met  Goal: *Tolerating diet  Outcome: Resolved/Met  Goal: *Performs self breast care  Outcome: Resolved/Met     Problem: Vaginal Delivery: Postpartum 2  Goal: Activity/Safety  Outcome: Resolved/Met  Goal: Consults, if ordered  Outcome: Resolved/Met  Goal: Nutrition/Diet  Outcome: Resolved/Met  Goal: Discharge Planning  Outcome: Resolved/Met  Goal: Medications  Outcome: Resolved/Met  Goal: Treatments/Interventions/Procedures  Outcome: Resolved/Met  Goal: Psychosocial  Outcome: Resolved/Met

## 2019-12-24 NOTE — LACTATION NOTE
This note was copied from a baby's chart. FOB translated for mom. Per FOB, mom is stating that she has \"no milk so she has been giving NAY\". Discussed supply and demand. Per FOB, mom understands. Breastfeeding discharge teaching completed to include feeding on demand, foremilk and hindmilk importance, engorgement, mastitis, clogged ducts, pumping, breastmilk storage, and returning to work. Information given about unit and office phone numbers and encouraged mom to reach out if concerns arise, but that [facility] would be calling her in the next few days to follow up on breastfeeding. Mom verbalized understanding and no questions at this time.

## 2019-12-24 NOTE — PROGRESS NOTES
1925 Bedside shift change report given to Janine Kim RN (oncoming nurse) by  Rosy Bullock LPN (offgoing nurse). Report included the following information SBAR, Kardex, Intake/Output, MAR and Recent Results. Pt feeding infant now. Bed in lowest position, call bell in reach. 2027 Rounding complete. Assessment complete at this time. Fundus firm at U, scant lochia rubra, no clots present. Pt denies headache, visual disturbances, ringing in the ears, SOB, chest pain, shooting pain in calves. Pt educated on previously stated signs and symptoms to report, plan of care for the night, proper liz care, pain management; infant feeding, safety, and I/O log sheet- FOB translated to pt, per FOB pt verbalized understanding. Opportunity for questions offered, pt denies questions at this time. No needs expressed at this time. Bed in lowest position, call bell in reach. 2316 Rounding complete. Pt in bed resting. No needs expressed. Bed in lowest position, call bell in reach. 0000 VSS per Brock Walton CNA   Temp Pulse Resp BP SpO2   12/24/19 0000 98.3 °F (36.8 °C) 81 18 116/76 100 %     0144 Rounding complete. Pt in bed resting. No needs expressed. Bed in lowest position, call bell in reach. 0410 Rounding complete. Pt in bed resting. No needs expressed. Bed in lowest position, call bell in reach. 0612 Rounding complete. Pt in bed resting. No needs expressed. Bed in lowest position, call bell in reach. 0730 Bedside and verbal shift change report given to PALAK Odell RN by Janine Kim RN. Report given with use of SBAR, Kardex, MAR, I/O, Recent Results.

## 2019-12-24 NOTE — PROGRESS NOTES
Post-Partum Day Number 2 Progress Note    Maura Gordon     Assessment:   Hospital Problems  Date Reviewed: 2019          Codes Class Noted POA    Vaginal delivery ICD-10-CM: O80  ICD-9-CM: 135  2019 No    Overview Signed 2019  7:49 AM by Enedina Mckeon MD     x1             Perineal laceration, first degree, delivered ICD-10-CM: O70.0  ICD-9-CM: 664.01  2019 No        Uterine contractions during pregnancy ICD-10-CM: O62.2  ICD-9-CM: 661.20  2019 Yes        Hx of maternal laceration, 4th degree, currently pregnant, third trimester ICD-10-CM: O09.293  ICD-9-CM: V23.49  2019 Yes        GBS (group B Streptococcus carrier), +RV culture, currently pregnant ICD-10-CM: O99.820  ICD-9-CM: V23.89, V02.51  2019 Yes        Pregnancy with 39 completed weeks gestation ICD-10-CM: Z3A.39  ICD-9-CM: V22.2  2019 Yes    Overview Signed 2019  3:57 PM by Enedina Mckeon MD     EFW 63%, 8 lbs. Vertex. Doing well, post partum day 2    Plan:   1. Discharge home today  2. Follow up in office in 6 weeks with Enedina Mckeon MD   3. Post partum activity advised, diet as tolerated  4. Discharge Medications: ibuprofen and medications prior to admission    Information for the patient's :  Derl Soda [[id number]]   Vaginal, Spontaneous   Patient doing well without significant complaint. Voiding without difficulty, normal lochia. Patient has a stitch sticking out of her vagina and would like it to be trimmed. Patient is nursing without problems. Reviewed importance of flu and Tdap vaccines.      Current Facility-Administered Medications   Medication Dose Route Frequency    diph,Pertuss(AC),Tet Vac-PF (BOOSTRIX) suspension 0.5 mL  0.5 mL IntraMUSCular PRIOR TO DISCHARGE    ferrous sulfate tablet 325 mg  1 Tab Oral BID WITH MEALS    influenza vaccine  (6 mos+)(PF) (FLUARIX/FLULAVAL/FLUZONE QUAD) injection 0.5 mL  0.5 mL IntraMUSCular PRIOR TO DISCHARGE       Vitals:  Visit Vitals  /74 (BP 1 Location: Left arm, BP Patient Position: At rest)   Pulse 83   Temp 97.5 °F (36.4 °C)   Resp 16   Ht 5' 1\" (1.549 m)   Wt 151 lb (68.5 kg)   LMP 2019 (Approximate)   SpO2 100%   Breastfeeding Unknown   BMI 28.53 kg/m²     Temp (24hrs), Av.9 °F (36.6 °C), Min:97.5 °F (36.4 °C), Max:98.3 °F (36.8 °C)      Exam:         Patient without distress. Abdomen soft, fundus firm, nontender  Perineum:  Long piece of suture trimmed to vaginal opening, appears to be a 2-0 chromic. Lower extremities are negative for swelling, cords or tenderness. Labs:     Lab Results   Component Value Date/Time    WBC 10.6 2019 06:35 PM    WBC 6.4 2019 09:19 AM    HGB 9.6 (L) 2019 02:55 AM    HGB 10.3 (L) 2019 06:35 PM    HGB 10.1 (L) 2019 09:19 AM    HCT 30.7 (L) 2019 02:55 AM    HCT 32.7 (L) 2019 06:35 PM    HCT 31.9 (L) 2019 09:19 AM    PLATELET 397  06:35 PM    PLATELET 515  09:19 AM    Hgb, External 11.1 2019    Hct, External 34 2019    Platelet cnt., External 185 2019       No results found for this or any previous visit (from the past 24 hour(s)).